# Patient Record
Sex: MALE | Race: WHITE | NOT HISPANIC OR LATINO | Employment: OTHER
[De-identification: names, ages, dates, MRNs, and addresses within clinical notes are randomized per-mention and may not be internally consistent; named-entity substitution may affect disease eponyms.]

---

## 2021-03-02 ENCOUNTER — TRANSCRIBE ORDERS (OUTPATIENT)
Dept: OTHER | Age: 76
End: 2021-03-02

## 2021-03-02 DIAGNOSIS — R53.81 PHYSICAL DECONDITIONING: ICD-10-CM

## 2021-03-02 DIAGNOSIS — R26.89 BALANCE PROBLEMS: Primary | ICD-10-CM

## 2021-03-24 ENCOUNTER — HOSPITAL ENCOUNTER (OUTPATIENT)
Dept: PHYSICAL THERAPY | Facility: CLINIC | Age: 76
Setting detail: THERAPIES SERIES
End: 2021-03-24
Attending: PHYSICIAN ASSISTANT
Payer: MEDICARE

## 2021-03-24 PROCEDURE — 97161 PT EVAL LOW COMPLEX 20 MIN: CPT | Mod: GP | Performed by: PHYSICAL THERAPIST

## 2021-03-24 PROCEDURE — 97110 THERAPEUTIC EXERCISES: CPT | Mod: GP | Performed by: PHYSICAL THERAPIST

## 2021-03-24 NOTE — PROGRESS NOTES
[facility]        OUTPATIENT PHYSICAL THERAPY FUNCTIONAL EVALUATION  PLAN OF TREATMENT FOR OUTPATIENT REHABILITATION  (COMPLETE FOR INITIAL CLAIMS ONLY)  Patient's Last Name, First Name, M.I.  YOB: 1945  Lance Carson     Provider's Name   [facility]   Medical Record No.  [id number]     Start of Care Date:  03/24/21   Onset Date:  03/24/20   Type:     _X__PT   ____OT  ____SLP Medical Diagnosis:  balance problems R26.89 physical deconditioning R53.81     PT Diagnosis:  decrease gait and balance  Visits from SOC:  1                              __________________________________________________________________________________  Plan of Treatment/Functional Goals:  ADL retraining, balance training, ROM, strengthening, stretching           GOALS  1  instruction in HEP and compliant with it 5 of 7 days   06/24/21    2  patient to have improved strength and endurance with gait currently 6 minutes walk 860feet   06/24/21    3  patient to be able to stand rhomberg stance currently unable to   06/24/21          Therapy Frequency:      Predicted Duration of Therapy Intervention:  1x week x 1-3 months     María Lewis, PT                                    I CERTIFY THE NEED FOR THESE SERVICES FURNISHED UNDER        THIS PLAN OF TREATMENT AND WHILE UNDER MY CARE .             Physician Signature               Date    X_____________________________________________________                      Certification Date From:  03/24/21   Certification Date To:  06/24/21    Referring Provider:  violet RICK    Initial Assessment  See Epic Evaluation- Start of Care Date: 03/24/21
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
## 2021-03-30 ENCOUNTER — HOSPITAL ENCOUNTER (OUTPATIENT)
Dept: PHYSICAL THERAPY | Facility: CLINIC | Age: 76
Setting detail: THERAPIES SERIES
End: 2021-03-30
Attending: PHYSICIAN ASSISTANT
Payer: MEDICARE

## 2021-03-30 PROCEDURE — 97110 THERAPEUTIC EXERCISES: CPT | Mod: GP | Performed by: PHYSICAL THERAPIST

## 2021-04-05 ENCOUNTER — HOSPITAL ENCOUNTER (OUTPATIENT)
Dept: PHYSICAL THERAPY | Facility: CLINIC | Age: 76
Setting detail: THERAPIES SERIES
End: 2021-04-05
Attending: PHYSICIAN ASSISTANT
Payer: MEDICARE

## 2021-04-05 PROCEDURE — 97110 THERAPEUTIC EXERCISES: CPT | Mod: GP | Performed by: PHYSICAL THERAPIST

## 2021-04-12 ENCOUNTER — HOSPITAL ENCOUNTER (OUTPATIENT)
Dept: PHYSICAL THERAPY | Facility: CLINIC | Age: 76
Setting detail: THERAPIES SERIES
End: 2021-04-12
Attending: PHYSICIAN ASSISTANT
Payer: MEDICARE

## 2021-04-12 PROCEDURE — 97110 THERAPEUTIC EXERCISES: CPT | Mod: GP | Performed by: PHYSICAL THERAPIST

## 2021-04-21 ENCOUNTER — HOSPITAL ENCOUNTER (OUTPATIENT)
Dept: PHYSICAL THERAPY | Facility: CLINIC | Age: 76
Setting detail: THERAPIES SERIES
End: 2021-04-21
Attending: PHYSICIAN ASSISTANT
Payer: MEDICARE

## 2021-04-21 PROCEDURE — 97110 THERAPEUTIC EXERCISES: CPT | Mod: GP | Performed by: PHYSICAL THERAPIST

## 2021-05-03 ENCOUNTER — HOSPITAL ENCOUNTER (OUTPATIENT)
Dept: PHYSICAL THERAPY | Facility: CLINIC | Age: 76
Setting detail: THERAPIES SERIES
End: 2021-05-03
Attending: PHYSICIAN ASSISTANT
Payer: MEDICARE

## 2021-05-03 PROCEDURE — 97110 THERAPEUTIC EXERCISES: CPT | Mod: GP | Performed by: PHYSICAL THERAPIST

## 2021-05-10 ENCOUNTER — HOSPITAL ENCOUNTER (OUTPATIENT)
Dept: PHYSICAL THERAPY | Facility: CLINIC | Age: 76
Setting detail: THERAPIES SERIES
End: 2021-05-10
Attending: PHYSICIAN ASSISTANT
Payer: MEDICARE

## 2021-05-10 PROCEDURE — 97110 THERAPEUTIC EXERCISES: CPT | Mod: GP | Performed by: PHYSICAL THERAPIST

## 2021-05-17 ENCOUNTER — HOSPITAL ENCOUNTER (OUTPATIENT)
Dept: PHYSICAL THERAPY | Facility: CLINIC | Age: 76
Setting detail: THERAPIES SERIES
End: 2021-05-17
Attending: PHYSICIAN ASSISTANT
Payer: MEDICARE

## 2021-05-17 PROCEDURE — 97110 THERAPEUTIC EXERCISES: CPT | Mod: GP | Performed by: PHYSICAL THERAPIST

## 2021-05-24 ENCOUNTER — HOSPITAL ENCOUNTER (OUTPATIENT)
Dept: PHYSICAL THERAPY | Facility: CLINIC | Age: 76
Setting detail: THERAPIES SERIES
End: 2021-05-24
Attending: PHYSICIAN ASSISTANT
Payer: MEDICARE

## 2021-05-24 PROCEDURE — 97110 THERAPEUTIC EXERCISES: CPT | Mod: GP | Performed by: PHYSICAL THERAPIST

## 2021-06-29 NOTE — PROGRESS NOTES
Outpatient Physical Therapy Discharge Note     Patient: Lance Carson  : 1945    Beginning/End Dates of Reporting Period:  3/24/2021 to 2021    Referring Provider: violet Islas Diagnosis: balance and strength      Client Self Report: feels like his legs are getting stronger and his balance is better     Objective Measurements:  Objective Measure: 6 minute 860 at eval   Details: currently 6 minute 890feet          patient seen for 9 Rx sessions for exercises in clinic and instruction and progression of HEP at last Rx he was completing the following reviewed HEP bridges , SLR, hip abduction , prone knee flexion 15 x goal 30 , nustep x 15 level 5    , bosu x 5 at bar , at home rhomberg x 1 minute , side step on 4 inch step 10x     He called and cancelled his last 3 Rx sessions and was going to continue on HEP         Goals:  Goal Identifier 1   Goal Description instruction in HEP and compliant with it 5 of 7 days    Target Date 21   Date Met      Progress:     Goal Identifier 2   Goal Description patient to have improved strength and endurance with gait currently 6 minutes walk 860feet    Target Date 21   Date Met      Progress:     Goal Identifier 3   Goal Description patient to be able to stand rhomberg stance currently unable to    Target Date 21   Date Met      Progress:       Progress Toward Goals:   Progress this reporting period: patient was very compliant with HEP and felt he was doing well on HEP and discontinued his therapy   Plan:  Discharge from therapy.    Discharge:    Reason for Discharge: Patient has met all goals.  Patient chooses to discontinue therapy.    Equipment Issued: none    Discharge Plan: Patient to continue home program.

## 2024-02-16 ENCOUNTER — HOSPITAL ENCOUNTER (EMERGENCY)
Facility: CLINIC | Age: 79
Discharge: HOME | End: 2024-02-16
Attending: PHYSICIAN ASSISTANT | Admitting: PHYSICIAN ASSISTANT
Payer: COMMERCIAL

## 2024-02-16 VITALS
WEIGHT: 263 LBS | DIASTOLIC BLOOD PRESSURE: 94 MMHG | TEMPERATURE: 98.2 F | OXYGEN SATURATION: 96 % | HEART RATE: 91 BPM | SYSTOLIC BLOOD PRESSURE: 121 MMHG | RESPIRATION RATE: 18 BRPM

## 2024-02-16 DIAGNOSIS — R33.9 URINARY RETENTION: ICD-10-CM

## 2024-02-16 LAB
ALBUMIN UR-MCNC: NEGATIVE MG/DL
APPEARANCE UR: CLEAR
BILIRUB UR QL STRIP: NEGATIVE
COLOR UR AUTO: YELLOW
GLUCOSE UR STRIP-MCNC: NEGATIVE MG/DL
HGB UR QL STRIP: ABNORMAL
KETONES UR STRIP-MCNC: NEGATIVE MG/DL
LEUKOCYTE ESTERASE UR QL STRIP: NEGATIVE
MUCOUS THREADS #/AREA URNS LPF: PRESENT /LPF
NITRATE UR QL: NEGATIVE
PH UR STRIP: 8 [PH] (ref 5–7)
RBC URINE: 36 /HPF
SP GR UR STRIP: 1.01 (ref 1–1.03)
UROBILINOGEN UR STRIP-MCNC: NORMAL MG/DL
WBC URINE: 1 /HPF

## 2024-02-16 PROCEDURE — 51702 INSERT TEMP BLADDER CATH: CPT | Performed by: PHYSICIAN ASSISTANT

## 2024-02-16 PROCEDURE — 99283 EMERGENCY DEPT VISIT LOW MDM: CPT | Performed by: PHYSICIAN ASSISTANT

## 2024-02-16 PROCEDURE — 51798 US URINE CAPACITY MEASURE: CPT | Performed by: PHYSICIAN ASSISTANT

## 2024-02-16 PROCEDURE — 99284 EMERGENCY DEPT VISIT MOD MDM: CPT | Performed by: PHYSICIAN ASSISTANT

## 2024-02-16 PROCEDURE — 81001 URINALYSIS AUTO W/SCOPE: CPT | Performed by: PHYSICIAN ASSISTANT

## 2024-02-16 PROCEDURE — 87086 URINE CULTURE/COLONY COUNT: CPT | Performed by: PHYSICIAN ASSISTANT

## 2024-02-16 ASSESSMENT — ACTIVITIES OF DAILY LIVING (ADL): ADLS_ACUITY_SCORE: 33

## 2024-02-16 NOTE — ED PROVIDER NOTES
History     Chief Complaint   Patient presents with    Dysuria       HPI  Lance Carson is a 78 year old male with a history of alcoholic cirrhosis of the liver complicated by hepatocellular carcinoma, hypertension, borderline diabetes, who presents to the emergency department complaining of difficulty with urination.  The patient reports he has a history of intermittent urinary retention and at one point developed urosepsis from.  After that episode he saw urology and he was placed on Flomax.  Overall things have been okay since then but he started having difficulty with urination last night.  He took an extra Flomax tablet around 2 AM thinking that might help and then took his morning dose as well today but neither of these provided any relief.  He continues to have difficulty making any type of stream.  He did try to straight cath himself which he does not normally do but he did not see much urine come out with that either.  He has not been drinking as much fluids as normal but does note a lot of pressure near his bladder and the urge to urinate.  He has not had any fevers, body aches, lightheadedness, nausea or vomiting.  No flank pain.        Allergies:  No Known Allergies    Problem List:    There are no problems to display for this patient.       Past Medical History:    No past medical history on file.    Past Surgical History:    No past surgical history on file.    Family History:    No family history on file.    Social History:  Marital Status:   [2]        Medications:    No current outpatient medications on file.        Review of Systems   All other systems reviewed and are negative.          Physical Exam   BP: (!) 174/127  Pulse: 101  Temp: 98.2  F (36.8  C)  Resp: 18  Weight: 119.3 kg (263 lb)  SpO2: 97 %      Physical Exam  Vitals and nursing note reviewed.   Constitutional:       General: He is not in acute distress.     Appearance: He is well-developed. He is not diaphoretic.   HENT:       Head: Normocephalic and atraumatic.      Nose: Nose normal.   Eyes:      Conjunctiva/sclera: Conjunctivae normal.      Pupils: Pupils are equal, round, and reactive to light.   Cardiovascular:      Rate and Rhythm: Normal rate and regular rhythm.      Heart sounds: Normal heart sounds.   Pulmonary:      Effort: Pulmonary effort is normal. No respiratory distress.      Breath sounds: Normal breath sounds.   Abdominal:      General: Bowel sounds are normal. There is no distension.      Palpations: Abdomen is soft.      Tenderness: There is no abdominal tenderness.   Musculoskeletal:         General: No deformity.      Cervical back: Neck supple.   Skin:     General: Skin is warm and dry.   Neurological:      General: No focal deficit present.      Mental Status: He is alert and oriented to person, place, and time. Mental status is at baseline.      Coordination: Coordination normal.   Psychiatric:         Mood and Affect: Mood normal.           ED Course           Procedures      Results for orders placed or performed during the hospital encounter of 02/16/24 (from the past 24 hour(s))   UA with Microscopic reflex to Culture    Specimen: Urine, Catheter   Result Value Ref Range    Color Urine Yellow Colorless, Straw, Light Yellow, Yellow    Appearance Urine Clear Clear    Glucose Urine Negative Negative mg/dL    Bilirubin Urine Negative Negative    Ketones Urine Negative Negative mg/dL    Specific Gravity Urine 1.011 1.003 - 1.035    Blood Urine Moderate (A) Negative    pH Urine 8.0 (H) 5.0 - 7.0    Protein Albumin Urine Negative Negative mg/dL    Urobilinogen Urine Normal Normal, 2.0 mg/dL    Nitrite Urine Negative Negative    Leukocyte Esterase Urine Negative Negative    Mucus Urine Present (A) None Seen /LPF    RBC Urine 36 (H) <=2 /HPF    WBC Urine 1 <=5 /HPF    Narrative    Urine Culture not indicated       Medications - No data to display      Assessments & Plan (with Medical Decision Making)  Lance Carson is a  78 year old male who presents to the ED complaining of urinary retention.  Started last night and he has developed worsening pressure and discomfort since then.  Unable to make more than a dribble.  He tried self cathing without relief at home.  Here today he was initially hypertensive but otherwise had reassuring vital signs.  Did not appear acutely ill or toxic.  Unremarkable exam.  Bladder scan for nearly 500 cc of urine.  Given history of urinary retention, decision made to place Rod catheter for definitive relief/management.  Rod placed without complication and over 500 cc of urine output.  Patient noted immediate improvement.  Urinalysis sent for evaluation which showed no signs of acute infection.  Did have 36 RBCs present.  Culture is pending.  Patient has a follow-up appointment with his urologist on March 6 however I think he will need to follow-up sooner given his acute urinary retention.  He was advised to contact them and schedule follow-up early next week, if unable to get in with urology should follow-up in the clinic with his primary.  No antibiotics will be prescribed given reassuring urinalysis however if culture results are positive he will receive a call to initiate antibiotics.  He was provided instructions on Rod cares as well as indications of when to return to the ED.  All questions answered and patient discharged in suitable condition.     I have reviewed the nursing notes.    I have reviewed the findings, diagnosis, plan and need for follow up with the patient.      There are no discharge medications for this patient.      Final diagnoses:   Urinary retention     Note: Chart documentation done in part with Dragon Voice Recognition software. Although reviewed after completion, some word and grammatical errors may remain.     2/16/2024   [facility] EMERGENCY DEPT       Aletha Barrios PA-C  02/16/24 1932

## 2024-02-16 NOTE — ED TRIAGE NOTES
Pt on flomax woke last night with decreased flow, increased pressure in bladder. Denies fever but has history of urosepsis related to same issue about 3 years ago. Hypertensive. Self cathed at home with some hematuria reported, pressure not alleviated.      Triage Assessment (Adult)       Row Name 02/16/24 1649          Triage Assessment    Airway WDL WDL

## 2024-02-16 NOTE — ED NOTES
PT presents with c/o urinary hesitancy and urinary retention, states that he is seeing a urologist for the same reason and was prescribed flomax, PT states that he self-catheterized today but was unable to pass urine, PT reports scant bleeding on the tip of the urethra after doing the procedure around 1200HRS today, PT reports that he feels like his bladder is full but unable to urinate, provider in the room to assess the PT with orders to start IFC.

## 2024-02-17 ENCOUNTER — APPOINTMENT (OUTPATIENT)
Dept: CT IMAGING | Facility: CLINIC | Age: 79
End: 2024-02-17
Attending: PHYSICIAN ASSISTANT
Payer: COMMERCIAL

## 2024-02-17 ENCOUNTER — HOSPITAL ENCOUNTER (EMERGENCY)
Facility: CLINIC | Age: 79
Discharge: HOME | End: 2024-02-17
Attending: PHYSICIAN ASSISTANT | Admitting: PHYSICIAN ASSISTANT
Payer: COMMERCIAL

## 2024-02-17 VITALS
DIASTOLIC BLOOD PRESSURE: 81 MMHG | HEART RATE: 81 BPM | WEIGHT: 263 LBS | SYSTOLIC BLOOD PRESSURE: 134 MMHG | TEMPERATURE: 98.2 F | RESPIRATION RATE: 18 BRPM | OXYGEN SATURATION: 94 %

## 2024-02-17 DIAGNOSIS — N30.01 ACUTE CYSTITIS WITH HEMATURIA: ICD-10-CM

## 2024-02-17 LAB
ALBUMIN UR-MCNC: 100 MG/DL
ANION GAP SERPL CALCULATED.3IONS-SCNC: 11 MMOL/L (ref 7–15)
APPEARANCE UR: ABNORMAL
BACTERIA #/AREA URNS HPF: ABNORMAL /HPF
BASOPHILS # BLD AUTO: 0.1 10E3/UL (ref 0–0.2)
BASOPHILS NFR BLD AUTO: 1 %
BILIRUB UR QL STRIP: NEGATIVE
BUN SERPL-MCNC: 13 MG/DL (ref 8–23)
CALCIUM SERPL-MCNC: 8.9 MG/DL (ref 8.8–10.2)
CHLORIDE SERPL-SCNC: 103 MMOL/L (ref 98–107)
COLOR UR AUTO: ABNORMAL
CREAT SERPL-MCNC: 0.93 MG/DL (ref 0.67–1.17)
CRP SERPL-MCNC: 5.55 MG/L
DEPRECATED HCO3 PLAS-SCNC: 24 MMOL/L (ref 22–29)
EGFRCR SERPLBLD CKD-EPI 2021: 84 ML/MIN/1.73M2
EOSINOPHIL # BLD AUTO: 0.1 10E3/UL (ref 0–0.7)
EOSINOPHIL NFR BLD AUTO: 1 %
ERYTHROCYTE [DISTWIDTH] IN BLOOD BY AUTOMATED COUNT: 13.1 % (ref 10–15)
GLUCOSE SERPL-MCNC: 135 MG/DL (ref 70–99)
GLUCOSE UR STRIP-MCNC: NEGATIVE MG/DL
HCT VFR BLD AUTO: 45.9 % (ref 40–53)
HGB BLD-MCNC: 15.5 G/DL (ref 13.3–17.7)
HGB UR QL STRIP: ABNORMAL
IMM GRANULOCYTES # BLD: 0.1 10E3/UL
IMM GRANULOCYTES NFR BLD: 1 %
KETONES UR STRIP-MCNC: NEGATIVE MG/DL
LEUKOCYTE ESTERASE UR QL STRIP: ABNORMAL
LYMPHOCYTES # BLD AUTO: 1.4 10E3/UL (ref 0.8–5.3)
LYMPHOCYTES NFR BLD AUTO: 13 %
MCH RBC QN AUTO: 30.5 PG (ref 26.5–33)
MCHC RBC AUTO-ENTMCNC: 33.8 G/DL (ref 31.5–36.5)
MCV RBC AUTO: 90 FL (ref 78–100)
MONOCYTES # BLD AUTO: 1.1 10E3/UL (ref 0–1.3)
MONOCYTES NFR BLD AUTO: 10 %
MUCOUS THREADS #/AREA URNS LPF: PRESENT /LPF
NEUTROPHILS # BLD AUTO: 8 10E3/UL (ref 1.6–8.3)
NEUTROPHILS NFR BLD AUTO: 74 %
NITRATE UR QL: NEGATIVE
NRBC # BLD AUTO: 0 10E3/UL
NRBC BLD AUTO-RTO: 0 /100
PH UR STRIP: 7 [PH] (ref 5–7)
PLATELET # BLD AUTO: 157 10E3/UL (ref 150–450)
POTASSIUM SERPL-SCNC: 3.4 MMOL/L (ref 3.4–5.3)
RBC # BLD AUTO: 5.09 10E6/UL (ref 4.4–5.9)
RBC URINE: >182 /HPF
SODIUM SERPL-SCNC: 138 MMOL/L (ref 135–145)
SP GR UR STRIP: 1.02 (ref 1–1.03)
SQUAMOUS EPITHELIAL: 1 /HPF
UROBILINOGEN UR STRIP-MCNC: 4 MG/DL
WBC # BLD AUTO: 10.7 10E3/UL (ref 4–11)
WBC URINE: 43 /HPF

## 2024-02-17 PROCEDURE — 250N000011 HC RX IP 250 OP 636: Performed by: PHYSICIAN ASSISTANT

## 2024-02-17 PROCEDURE — 250N000009 HC RX 250: Performed by: PHYSICIAN ASSISTANT

## 2024-02-17 PROCEDURE — 74177 CT ABD & PELVIS W/CONTRAST: CPT

## 2024-02-17 PROCEDURE — 99284 EMERGENCY DEPT VISIT MOD MDM: CPT | Performed by: PHYSICIAN ASSISTANT

## 2024-02-17 PROCEDURE — 87086 URINE CULTURE/COLONY COUNT: CPT | Performed by: PHYSICIAN ASSISTANT

## 2024-02-17 PROCEDURE — 81001 URINALYSIS AUTO W/SCOPE: CPT | Performed by: PHYSICIAN ASSISTANT

## 2024-02-17 PROCEDURE — 86140 C-REACTIVE PROTEIN: CPT | Performed by: PHYSICIAN ASSISTANT

## 2024-02-17 PROCEDURE — 85025 COMPLETE CBC W/AUTO DIFF WBC: CPT | Performed by: PHYSICIAN ASSISTANT

## 2024-02-17 PROCEDURE — 99285 EMERGENCY DEPT VISIT HI MDM: CPT | Mod: 25

## 2024-02-17 PROCEDURE — 36415 COLL VENOUS BLD VENIPUNCTURE: CPT | Performed by: PHYSICIAN ASSISTANT

## 2024-02-17 PROCEDURE — 80048 BASIC METABOLIC PNL TOTAL CA: CPT | Performed by: PHYSICIAN ASSISTANT

## 2024-02-17 RX ORDER — SULFAMETHOXAZOLE/TRIMETHOPRIM 800-160 MG
1 TABLET ORAL 2 TIMES DAILY
Qty: 14 TABLET | Refills: 0 | Status: SHIPPED | OUTPATIENT
Start: 2024-02-17

## 2024-02-17 RX ORDER — IOPAMIDOL 755 MG/ML
500 INJECTION, SOLUTION INTRAVASCULAR ONCE
Status: COMPLETED | OUTPATIENT
Start: 2024-02-17 | End: 2024-02-17

## 2024-02-17 RX ADMIN — SODIUM CHLORIDE 63 ML: 9 INJECTION, SOLUTION INTRAVENOUS at 15:29

## 2024-02-17 RX ADMIN — IOPAMIDOL 100 ML: 755 INJECTION, SOLUTION INTRAVENOUS at 15:29

## 2024-02-17 ASSESSMENT — ACTIVITIES OF DAILY LIVING (ADL): ADLS_ACUITY_SCORE: 35

## 2024-02-17 NOTE — DISCHARGE INSTRUCTIONS
Your urine did not show any signs of infection.  I did send it for culture to see if any bacteria grow in any way and if this does not, you will receive a call to start antibiotics.  I would like you to follow-up with your urology team early next week or your primary care provider to have the Rod removed.  If you are unable to get in with them or if you have any worsening symptoms please do not hesitate to return to the emergency department.    Thank you for choosing [facility]'s Emergency Department. It was a pleasure taking care of you today. If you have any questions, please call [phone].    Aletha Barrios PA-C

## 2024-02-17 NOTE — DISCHARGE INSTRUCTIONS
Please take the antibiotics as prescribed to treat your bladder infection.  A urine culture is also pending.  Follow-up next week with your urology or primary care team.  If you have any worsening symptoms please return to the emergency department.

## 2024-02-17 NOTE — ED PROVIDER NOTES
History     Chief Complaint   Patient presents with    Catheter Problem       HPI  Lance Carson is a 78 year old male who presents to the emergency department with catheter issues.  He was seen here in the ED yesterday for urinary retention and a Rod catheter was placed.  He reported it drained okay yesterday and he slept well overnight but when he woke up he had some pressure in his bladder region and the tube did not seem to be draining as well.  He switched the leg bag over to the larger bag and it drained a little bit better but still he has intermittent bladder fullness and decreased urine output.  Urine started to look a little more cloudy and dark.  He has not had any fevers.  No nausea or vomiting.  No abdominal or flank pain.        Allergies:  No Known Allergies    Problem List:    There are no problems to display for this patient.       Past Medical History:    No past medical history on file.    Past Surgical History:    No past surgical history on file.    Family History:    No family history on file.    Social History:  Marital Status:   [2]        Medications:    sulfamethoxazole-trimethoprim (BACTRIM DS) 800-160 MG tablet          Review of Systems   All other systems reviewed and are negative.        Physical Exam   BP: 139/87  Pulse: 83  Temp: 98.2  F (36.8  C)  Resp: 18  Weight: 119.3 kg (263 lb)  SpO2: 92 %      Physical Exam  Vitals and nursing note reviewed.   Constitutional:       General: He is not in acute distress.     Appearance: Normal appearance. He is well-developed. He is not ill-appearing, toxic-appearing or diaphoretic.   HENT:      Head: Normocephalic and atraumatic.   Eyes:      Conjunctiva/sclera: Conjunctivae normal.      Pupils: Pupils are equal, round, and reactive to light.   Cardiovascular:      Rate and Rhythm: Normal rate and regular rhythm.      Heart sounds: Normal heart sounds.   Pulmonary:      Effort: Pulmonary effort is normal. No respiratory distress.       Breath sounds: Normal breath sounds.   Abdominal:      General: There is no distension.      Palpations: Abdomen is soft.      Tenderness: There is no abdominal tenderness.   Genitourinary:     Comments: Rod catheter with scant dark, cloudy urine in tubing.  Musculoskeletal:         General: No deformity.      Cervical back: Neck supple.   Skin:     General: Skin is warm and dry.   Neurological:      General: No focal deficit present.      Mental Status: He is alert and oriented to person, place, and time.      Coordination: Coordination normal.   Psychiatric:         Mood and Affect: Mood normal.           ED Course             Procedures      Results for orders placed or performed during the hospital encounter of 02/17/24 (from the past 24 hour(s))   CBC with platelets differential    Narrative    The following orders were created for panel order CBC with platelets differential.  Procedure                               Abnormality         Status                     ---------                               -----------         ------                     CBC with platelets and d...[[id number]]                      Final result                 Please view results for these tests on the individual orders.   Basic metabolic panel   Result Value Ref Range    Sodium 138 135 - 145 mmol/L    Potassium 3.4 3.4 - 5.3 mmol/L    Chloride 103 98 - 107 mmol/L    Carbon Dioxide (CO2) 24 22 - 29 mmol/L    Anion Gap 11 7 - 15 mmol/L    Urea Nitrogen 13.0 8.0 - 23.0 mg/dL    Creatinine 0.93 0.67 - 1.17 mg/dL    GFR Estimate 84 >60 mL/min/1.73m2    Calcium 8.9 8.8 - 10.2 mg/dL    Glucose 135 (H) 70 - 99 mg/dL   CRP inflammation   Result Value Ref Range    CRP Inflammation 5.55 (H) <5.00 mg/L   CBC with platelets and differential   Result Value Ref Range    WBC Count 10.7 4.0 - 11.0 10e3/uL    RBC Count 5.09 4.40 - 5.90 10e6/uL    Hemoglobin 15.5 13.3 - 17.7 g/dL    Hematocrit 45.9 40.0 - 53.0 %    MCV 90 78 - 100 fL    MCH 30.5 26.5  - 33.0 pg    MCHC 33.8 31.5 - 36.5 g/dL    RDW 13.1 10.0 - 15.0 %    Platelet Count 157 150 - 450 10e3/uL    % Neutrophils 74 %    % Lymphocytes 13 %    % Monocytes 10 %    % Eosinophils 1 %    % Basophils 1 %    % Immature Granulocytes 1 %    NRBCs per 100 WBC 0 <1 /100    Absolute Neutrophils 8.0 1.6 - 8.3 10e3/uL    Absolute Lymphocytes 1.4 0.8 - 5.3 10e3/uL    Absolute Monocytes 1.1 0.0 - 1.3 10e3/uL    Absolute Eosinophils 0.1 0.0 - 0.7 10e3/uL    Absolute Basophils 0.1 0.0 - 0.2 10e3/uL    Absolute Immature Granulocytes 0.1 <=0.4 10e3/uL    Absolute NRBCs 0.0 10e3/uL   UA with Microscopic reflex to Culture    Specimen: Urine, Rod Catheter   Result Value Ref Range    Color Urine Denae (A) Colorless, Straw, Light Yellow, Yellow    Appearance Urine Cloudy (A) Clear    Glucose Urine Negative Negative mg/dL    Bilirubin Urine Negative Negative    Ketones Urine Negative Negative mg/dL    Specific Gravity Urine 1.021 1.003 - 1.035    Blood Urine Large (A) Negative    pH Urine 7.0 5.0 - 7.0    Protein Albumin Urine 100 (A) Negative mg/dL    Urobilinogen Urine 4.0 (A) Normal, 2.0 mg/dL    Nitrite Urine Negative Negative    Leukocyte Esterase Urine Small (A) Negative    Bacteria Urine None Seen None Seen /HPF    Mucus Urine Present (A) None Seen /LPF    RBC Urine >182 (H) <=2 /HPF    WBC Urine 43 (H) <=5 /HPF    Squamous Epithelials Urine 1 <=1 /HPF    Narrative    Urine Culture ordered based on laboratory criteria   CT Abdomen Pelvis w Contrast    Narrative    EXAM: CT ABDOMEN PELVIS W CONTRAST  LOCATION: [facility]  DATE: 2/17/2024    INDICATION: Urinary retention, evaluate for obstructive process.  COMPARISON: None.  TECHNIQUE: CT scan of the abdomen and pelvis was performed following injection of IV contrast. Multiplanar reformats were obtained. Dose reduction techniques were used.  CONTRAST: Isovue 370, 100 mL.    FINDINGS:   LOWER CHEST: Coronary artery disease.    HEPATOBILIARY:  Cirrhosis. Likely focal fatty infiltration left and right lobes of the liver, further characterization with contrast-enhanced MRI may be obtained.    PANCREAS: Normal.    SPLEEN: Moderately enlarged.    ADRENAL GLANDS: Normal.    KIDNEYS/BLADDER: Simple left renal cyst. No obstructing renal or ureteral stones. No hydroureteronephrosis. Rod catheter with inflated balloon within bladder. Bladder wall thickening, correlate to exclude cystitis.    BOWEL: Normal appendix. Diverticulosis. No diverticulitis, colitis, or obstruction.    LYMPH NODES: Normal.    VASCULATURE: Moderate atherosclerotic vascular calcifications. No aneurysm. Periaortic embolization coils.    PELVIC ORGANS: Moderately enlarged prostate.    MUSCULOSKELETAL: Bilateral L5 spondylolysis with grade I anterolisthesis of L5 on S1.      Impression    IMPRESSION:   1.  Cirrhosis with splenomegaly.  2.  Indeterminate right and left hepatic hypodensities, may reflect focal fatty infiltration, recommend nonemergent evaluation with contrast-enhanced MRI.  3.  Simple renal cysts, no follow-up required.  4.  Diverticulosis. No diverticulitis, colitis, or obstruction.  5.  Coronary artery disease and atherosclerotic vascular disease.  6.  Moderately enlarged prostate.       Medications   CT Saline Flush 100mL (63 mLs As instructed $Given 2/17/24 1529)   iopamidol (ISOVUE-370) solution 500 mL (100 mLs Intravenous $Given 2/17/24 1529)         Assessments & Plan (with Medical Decision Making)  Lance Carson is a 78 year old male who presented to the ED complaining of decreased urinary output and bladder discomfort.  He was here yesterday for urinary retention and a Rod catheter was placed.  He has had no fevers, nausea or vomiting, or flank pain.  On arrival he had normal vital signs.  Exam overall benign but Rod catheter did have darker and cloudy appearing urine in comparison to yesterday.  Concern for obstructive process causing decreased urinary output  versus renal component  Versus infection.  IV established and labs drawn for further evaluation. Creatinine fortunately within normal limits at 0.93.  His white count was normal at 10.7 and CRP only minimally elevated at 5.55.  Urinalysis however showed large blood with 43 white cells in comparison to yesterday with no white cells along with small leukocyte esterase concerning for UTI.  We did get a CT scan as well which showed bladder wall thickening concerning for cystitis along with moderate enlarged prostate but no acute pathology otherwise causing urinary obstruction.  I went over these results with the patient.  Fortunately no signs of sepsis at this time and urine appeared to be flowing normally while here in the ED.  I think he is medically stable to discharge but for his UTI we will go ahead and prescribe antibiotics, see orders below with Bactrim.  He still plans to follow-up early next week with his urology team for reevaluation.  I went over warning signs and symptoms of when to return to the ED in the meantime.  All questions answered and patient discharged home in suitable condition.     I have reviewed the nursing notes.    I have reviewed the findings, diagnosis, plan and need for follow up with the patient.    Discharge Medication List as of 2/17/2024  4:19 PM        START taking these medications    Details   sulfamethoxazole-trimethoprim (BACTRIM DS) 800-160 MG tablet Take 1 tablet by mouth 2 times daily, Disp-14 tablet, R-0, E-Prescribe             Final diagnoses:   Acute cystitis with hematuria     Note: Chart documentation done in part with Dragon Voice Recognition software. Although reviewed after completion, some word and grammatical errors may remain.    2/17/2024   [facility] EMERGENCY DEPT       Aletha Barrios PA-C  02/17/24 2143

## 2024-02-17 NOTE — ED TRIAGE NOTES
Pt had fagan catheter placed yesterday for retention. Presents today with intermittent bladder fullness and states its not draining a lot of urine.

## 2024-02-18 ENCOUNTER — TELEPHONE (OUTPATIENT)
Dept: EMERGENCY MEDICINE | Facility: CLINIC | Age: 79
End: 2024-02-18
Payer: COMMERCIAL

## 2024-02-18 LAB
BACTERIA UR CULT: NO GROWTH
BACTERIA UR CULT: NO GROWTH

## 2024-02-18 NOTE — TELEPHONE ENCOUNTER
[facility]    Reason for call: Lab Result Notification     Lab Result (including Rx patient on, if applicable).  If culture, copy of lab report at bottom.  Lab Result: Final urine culture report is negative.     Adult: Negative urine culture parameters per protocol: No growth     [facility] Emergency Dept discharge antibiotic prescribed (If applicable): Sulfamethoxazole-Trimethoprim (Bactrim DS, Septra DS) 800-160 mg PO tablet,  1 tablet by mouth 2 times daily for 7 days.    Creatinine Level (mg/dl)   Creatinine   Date Value Ref Range Status   02/17/2024 0.93 0.67 - 1.17 mg/dL Final    Creatinine clearance (ml/min), if applicable    Creatinine clearance cannot be calculated (Unknown ideal weight.)     Patient's current Symptoms:  return call from patient,  Antibiotics 3 days prior to urine culture: None  Genitourinary symptoms:  some blood in the urine, doesn't drain very easily  Fever: None 97.9 F    RN Recommendations/Instructions per [place] ED lab result protocol:   [facility] ED lab result protocol utilized: Urine culture  Patient notified of lab results, advised ok to stop antibiotic at this time, he is hesitant with hx of sepsis, advised it is up to him to discontinue he can also connect with urology tomorrow, he discussed cutting the dose in half and taking it differently than prescribed and this was discouraged.  Probiotic encouraged, return for any catheter malfunction.    Patient/care giver notified to contact your PCP clinic or return to the Emergency department if your:  Symptoms return.  Symptoms do not improve after 3 days on antibiotic.  Symptoms do not resolve after completing antibiotic.  Symptoms worsen or other concerning symptoms.    Malinda Mayfield RN

## 2024-02-18 NOTE — TELEPHONE ENCOUNTER
[facility]    Reason for call: Lab Result Notification     Lab Result (including Rx patient on, if applicable).  If culture, copy of lab report at bottom.  Lab Result: Final urine culture report is negative.    Adult: Negative urine culture parameters per protocol: No growth     [facility] Emergency Dept discharge antibiotic prescribed (If applicable): Sulfamethoxazole-Trimethoprim (Bactrim DS, Septra DS) 800-160 mg PO tablet,  1 tablet by mouth 2 times daily for 7 days.    Creatinine Level (mg/dl)   Creatinine   Date Value Ref Range Status   02/17/2024 0.93 0.67 - 1.17 mg/dL Final    Creatinine clearance (ml/min), if applicable    Creatinine clearance cannot be calculated (Unknown ideal weight.)     Patient's current Symptoms:   Left voicemail message requesting a call back to [facility] ED Lab Result RN at [phone]. RN is available every day between 9 a.m. and 5:30 p.m.      Malinda Mayfield RN

## 2024-02-21 ENCOUNTER — HOSPITAL ENCOUNTER (EMERGENCY)
Facility: CLINIC | Age: 79
Discharge: HOME | End: 2024-02-21
Attending: EMERGENCY MEDICINE | Admitting: EMERGENCY MEDICINE
Payer: COMMERCIAL

## 2024-02-21 VITALS
BODY MASS INDEX: 35.79 KG/M2 | TEMPERATURE: 98.1 F | HEIGHT: 70 IN | RESPIRATION RATE: 22 BRPM | HEART RATE: 66 BPM | WEIGHT: 250 LBS | OXYGEN SATURATION: 92 % | DIASTOLIC BLOOD PRESSURE: 86 MMHG | SYSTOLIC BLOOD PRESSURE: 165 MMHG

## 2024-02-21 DIAGNOSIS — T83.091S: ICD-10-CM

## 2024-02-21 LAB
ALBUMIN UR-MCNC: >499 MG/DL
AMORPH CRY #/AREA URNS HPF: ABNORMAL /HPF
APPEARANCE UR: ABNORMAL
BILIRUB UR QL STRIP: NEGATIVE
COLOR UR AUTO: ABNORMAL
GLUCOSE UR STRIP-MCNC: NEGATIVE MG/DL
HGB UR QL STRIP: ABNORMAL
HYALINE CASTS: 19 /LPF
KETONES UR STRIP-MCNC: 5 MG/DL
LEUKOCYTE ESTERASE UR QL STRIP: NEGATIVE
MUCOUS THREADS #/AREA URNS LPF: PRESENT /LPF
NITRATE UR QL: NEGATIVE
PH UR STRIP: 5 [PH] (ref 5–7)
RBC URINE: >182 /HPF
SP GR UR STRIP: 1.03 (ref 1–1.03)
UROBILINOGEN UR STRIP-MCNC: 2 MG/DL
WBC URINE: 9 /HPF

## 2024-02-21 PROCEDURE — 99283 EMERGENCY DEPT VISIT LOW MDM: CPT | Performed by: EMERGENCY MEDICINE

## 2024-02-21 PROCEDURE — 81001 URINALYSIS AUTO W/SCOPE: CPT | Performed by: EMERGENCY MEDICINE

## 2024-02-21 PROCEDURE — 250N000009 HC RX 250: Performed by: EMERGENCY MEDICINE

## 2024-02-21 PROCEDURE — 99283 EMERGENCY DEPT VISIT LOW MDM: CPT | Mod: 25

## 2024-02-21 RX ORDER — LIDOCAINE HYDROCHLORIDE 20 MG/ML
JELLY TOPICAL ONCE
Status: COMPLETED | OUTPATIENT
Start: 2024-02-21 | End: 2024-02-21

## 2024-02-21 RX ADMIN — LIDOCAINE HYDROCHLORIDE 1 TUBE: 20 JELLY TOPICAL at 04:24

## 2024-02-21 ASSESSMENT — ACTIVITIES OF DAILY LIVING (ADL): ADLS_ACUITY_SCORE: 35

## 2024-02-21 NOTE — ED PROVIDER NOTES
"  History     Chief Complaint   Patient presents with    Catheter Problem     HPI  Lance Carson is a 78 year old male who returns to the emergency room with catheter issue.  Was seen on 2/16/2024 and 2/17/2024 for urinary retention.  First day a catheter was placed, and he was referred to urology for follow-up.  He returned the next day since he was not having much drainage from the catheter, and found to have urinary tract infection.  Was initiated on oral antibiotics.  He said that until today had been doing well, but overnight has not had anything draining into the leg bag from the catheter, and is having more distended abdomen and suprapubic pressure.  Is due to have his follow-up with urology today, but was unable to wait until the clinic appointments due to the amount of distention and pain being caused by urinary retention.  His wife does note that occasionally some small clots would pass through the catheter into the bag before this.  He does take Flomax daily.    Allergies:  No Known Allergies    Problem List:    There are no problems to display for this patient.       Past Medical History:    No past medical history on file.    Past Surgical History:    No past surgical history on file.    Family History:    No family history on file.    Social History:  Marital Status:   [2]        Medications:    sulfamethoxazole-trimethoprim (BACTRIM DS) 800-160 MG tablet          Review of Systems   All other systems reviewed and are negative.      Physical Exam   BP: (!) 180/157  Pulse: 84  Temp: 98.1  F (36.7  C)  Resp: 22  Height: 177.8 cm (5' 10\")  Weight: 113.4 kg (250 lb)  SpO2: 95 %      Physical Exam  Vitals and nursing note reviewed.   Constitutional:       General: He is in acute distress (In pain).   Abdominal:      General: There is distension.      Tenderness: There is abdominal tenderness in the suprapubic area.   Genitourinary:     Penis: Normal.       Comments: Dark yellow urine in leg bag, but " nothing draining into the tubing  Neurological:      Mental Status: He is alert.         ED Course                 Procedures              Critical Care time:  none               Results for orders placed or performed during the hospital encounter of 02/21/24 (from the past 24 hour(s))   UA with Microscopic reflex to Culture    Specimen: Urine, Rod Catheter   Result Value Ref Range    Color Urine Denae (A) Colorless, Straw, Light Yellow, Yellow    Appearance Urine Cloudy (A) Clear    Glucose Urine Negative Negative mg/dL    Bilirubin Urine Negative Negative    Ketones Urine 5 (A) Negative mg/dL    Specific Gravity Urine 1.029 1.003 - 1.035    Blood Urine Large (A) Negative    pH Urine 5.0 5.0 - 7.0    Protein Albumin Urine >499 (A) Negative mg/dL    Urobilinogen Urine 2.0 Normal, 2.0 mg/dL    Nitrite Urine Negative Negative    Leukocyte Esterase Urine Negative Negative    Mucus Urine Present (A) None Seen /LPF    Amorphous Crystals Urine Few (A) None Seen /HPF    RBC Urine >182 (H) <=2 /HPF    WBC Urine 9 (H) <=5 /HPF    Hyaline Casts Urine 19 (H) <=2 /LPF    Narrative    Urine Culture not indicated       Medications   lidocaine (XYLOCAINE) 2 % external gel (1 Tube Topical $Given 2/21/24 0399)       Assessments & Plan (with Medical Decision Making)  Ag is a 78-year-old male presenting for ongoing issues related to his Rod catheter.  Has been seen twice in the ED first for urinary retention on 2/16/2024, then the next day for obstruction of the catheter, and new diagnosis of UTI.  He comes back tonight due to nothing draining from his catheter overnight.  See history and physical exam as above  78-year-old male in moderate distress secondary to pain, abdomen is somewhat distended and tender in suprapubic region.  He does wince with spasms of pain as RN is trying to irrigate catheter.  Is able to instill a few milliliters of saline into the bladder but unable to pull back on plunger and remove the instilled  fluid.  Will plan to remove all catheter and replace with a new 1.  After bladder scanning, only noticed 300 mL of urine in the bladder.  However, unsuccessful in replacing obstructed urinary catheter with a new catheter.  He did have a clot that was causing an obstruction.  There is now dark red urine drainage within the leg bag.  A new sample was sent for UA, appears that evidence of infection that was present on 2/17/2024 is improving with the prescribed Bactrim.  Recommended that he keep follow-up appointment with urology this afternoon to discuss plan moving forward.  If any worsening symptoms should return promptly to the ER for evaluation.  All questions answered and discharged in stable condition     I have reviewed the nursing notes.    I have reviewed the findings, diagnosis, plan and need for follow up with the patient.           Medical Decision Making  The patient's presentation was of low complexity (an acute and uncomplicated illness or injury).    The patient's evaluation involved:  ordering and/or review of 1 test(s) in this encounter (see separate area of note for details)    The patient's management necessitated only low risk treatment.        New Prescriptions    No medications on file       Final diagnoses:   Obstructed Rod catheter, sequela       2/21/2024   [facility] EMERGENCY DEPT       Teodora Dumont,   02/21/24 0556

## 2024-02-21 NOTE — ED TRIAGE NOTES
Pt comes in today with c/o bladder pain r/t possible urinary catheter blockage. Has not had any urine output overnight. Has an appointment with urology today. Unable to wait d/t the pain     Triage Assessment (Adult)       Row Name 02/21/24 0402          Triage Assessment    Airway WDL WDL        Respiratory WDL    Respiratory WDL WDL        Skin Circulation/Temperature WDL    Skin Circulation/Temperature WDL WDL        Cardiac WDL    Cardiac WDL WDL        Peripheral/Neurovascular WDL    Peripheral Neurovascular WDL WDL        Cognitive/Neuro/Behavioral WDL    Cognitive/Neuro/Behavioral WDL WDL

## 2024-02-21 NOTE — DISCHARGE INSTRUCTIONS
I'm sorry you're having so much trouble with the catheter. Hopefully this new catheter will continue to drain and not get blocked.    Go to your urology appointment that is scheduled today for follow-up    Continue the antibiotic that was previously prescribed, but it looks like it is starting to clear up the previously noted infection in your urine    If you develop any significant new or worsening symptoms do not hesitate to return to the emergency room for evaluation

## 2024-02-21 NOTE — ED NOTES
Sent patient and spouse home with extra catheter supplies, advised on cleaning and dressing changes. Gave patient and spouse discharge education and paperwork. All questions answered. Patient and spouse felt comfortable discharging home and following up later today with Urology.